# Patient Record
Sex: FEMALE | Race: WHITE | NOT HISPANIC OR LATINO | URBAN - METROPOLITAN AREA
[De-identification: names, ages, dates, MRNs, and addresses within clinical notes are randomized per-mention and may not be internally consistent; named-entity substitution may affect disease eponyms.]

---

## 2018-04-19 ENCOUNTER — ANESTHESIA (OUTPATIENT)
Dept: ENDOSCOPY | Facility: HOSPITAL | Age: 53
End: 2018-04-19
Payer: COMMERCIAL

## 2018-04-19 ENCOUNTER — ANESTHESIA EVENT (OUTPATIENT)
Dept: ENDOSCOPY | Facility: HOSPITAL | Age: 53
End: 2018-04-19
Payer: COMMERCIAL

## 2018-04-19 PROBLEM — Z12.11 COLON CANCER SCREENING: Status: ACTIVE | Noted: 2018-04-19

## 2018-04-19 PROCEDURE — 25800000 HC PHARMACY IV SOLUTIONS: Performed by: SPECIALIST

## 2018-04-19 PROCEDURE — 63600000 HC DRUGS/DETAIL CODE: Performed by: SPECIALIST

## 2018-04-19 RX ORDER — SODIUM CHLORIDE, SODIUM GLUCONATE, SODIUM ACETATE, POTASSIUM CHLORIDE AND MAGNESIUM CHLORIDE 30; 37; 368; 526; 502 MG/100ML; MG/100ML; MG/100ML; MG/100ML; MG/100ML
INJECTION, SOLUTION INTRAVENOUS CONTINUOUS PRN
Status: DISCONTINUED | OUTPATIENT
Start: 2018-04-19 | End: 2018-04-19 | Stop reason: SURG

## 2018-04-19 RX ORDER — PROPOFOL 10 MG/ML
INJECTION, EMULSION INTRAVENOUS CONTINUOUS PRN
Status: DISCONTINUED | OUTPATIENT
Start: 2018-04-19 | End: 2018-04-19 | Stop reason: SURG

## 2018-04-19 RX ADMIN — PROPOFOL 150 MCG/KG/MIN: 10 INJECTION, EMULSION INTRAVENOUS at 09:21

## 2018-04-19 RX ADMIN — SODIUM CHLORIDE, SODIUM GLUCONATE, SODIUM ACETATE, POTASSIUM CHLORIDE AND MAGNESIUM CHLORIDE: 526; 502; 368; 37; 30 INJECTION, SOLUTION INTRAVENOUS at 09:21

## 2018-04-19 ASSESSMENT — PAIN SCALES - GENERAL: PAIN_LEVEL: 1

## 2018-04-19 NOTE — ANESTHESIA POSTPROCEDURE EVALUATION
Patient: Jen Rocha    Procedure Summary     Date:  04/19/18 Room / Location:  [facility] GI 2 / [facility] GI    Anesthesia Start:  0922 Anesthesia Stop:  0955    Procedure:  Colonoscopy (N/A Anus) Diagnosis:  (Screening)    Provider:  Jv Barroso MD Responsible Provider:  Bulmaro Pettit MD    Anesthesia Type:  MAC ASA Status:  2          Anesthesia Type: MAC  PACU Vitals     No data found.            Anesthesia Post Evaluation    Pain score: 1  Pain management: adequate  Patient location during evaluation: PACU  Patient participation: waiting for patient participation  Level of consciousness: responsive to touch and arousable  Cardiovascular status: acceptable  Airway Patency: adequate  Respiratory status: acceptable  Hydration status: acceptable  Anesthetic complications: no

## 2018-04-19 NOTE — ANESTHESIA PREPROCEDURE EVALUATION
Anesthesia ROS/MED HX      Endo/Other   Hypothyroidism   Body Habitus: Overweight      Past Surgical History:   Procedure Laterality Date   • ABDOMINAL SURGERY     • THYROIDECTOMY     • TUBAL LIGATION         Physical Exam    Airway   Mallampati: II   TM distance: >3 FB   Neck ROM: full  Cardiovascular - normal   Rhythm: regular   Rate: normal  Pulmonary - normal   clear to auscultation  Dental - normal        Anesthesia Plan    Plan: MAC    Technique: MAC     Airway: natural airway / supplemental oxygen   ASA 2  Anesthetic plan and risks discussed with: patient

## 2018-04-20 ENCOUNTER — TELEPHONE (OUTPATIENT)
Dept: SURGERY | Facility: CLINIC | Age: 53
End: 2018-04-20

## 2018-10-08 ENCOUNTER — OFFICE VISIT (OUTPATIENT)
Dept: SURGERY | Facility: CLINIC | Age: 53
End: 2018-10-08
Payer: COMMERCIAL

## 2018-10-08 VITALS — HEIGHT: 69 IN | WEIGHT: 187 LBS | BODY MASS INDEX: 27.7 KG/M2

## 2018-10-08 DIAGNOSIS — K62.5 RECTAL BLEEDING: Primary | ICD-10-CM

## 2018-10-08 PROCEDURE — 99244 OFF/OP CNSLTJ NEW/EST MOD 40: CPT | Performed by: SURGERY

## 2018-10-08 NOTE — ASSESSMENT & PLAN NOTE
This nice lady had six small polyps found on colonoscopy 6 months ago.  She has had three episodes of bleeding in one week which may have been precipitated by straining at stools.  Her GABRIELA is normal today and my plan is to simply observe.  If her bleeding increases in frequency then injection sclerotherapy may be helpful in the future.

## 2018-10-08 NOTE — LETTER
October 8, 2018     Norberto Vaughn MD  [address]  [place] [facility] [place] [address]    Patient: Jen Rocha   YOB: 1965   Date of Visit: 10/8/2018       Dear Dr. Vaughn:    Thank you for referring Jen Rocha to me for evaluation. Below are my notes for this consultation.    If you have questions, please do not hesitate to call me. I look forward to following your patient along with you.         Sincerely,        Jv Barroso MD        CC: No Recipients  Jv Barroso MD  10/8/2018  7:19 PM  Sign at close encounter      Chief Complaint:   Chief Complaint   Patient presents with   • Consult     anal bleeding   .    HPI     Patient is a 53 y.o. female who presents with the following:  Pt had colonoscopy 4/18 and 6 small polyps foun d- plan 3y for next scope -     4d ago had blood with BM's x2 - the bowl was red - a small amount on paper - could have been dripping - happened yesterday again -     Never happened before - she was def straining the day before the BM with the first blood - no pain at all - .     Medical History:   Past Medical History:   Diagnosis Date   • Arthritis    • H/O thyroid nodule        Surgical History:   Past Surgical History:   Procedure Laterality Date   • ABDOMINAL SURGERY     • THYROIDECTOMY     • TUBAL LIGATION         Social History:   Social History     Social History   • Marital status:      Spouse name: N/A   • Number of children: N/A   • Years of education: N/A     Occupational History   • Not on file.     Social History Main Topics   • Smoking status: Current Every Day Smoker     Packs/day: 0.50     Years: 30.00   • Smokeless tobacco: Never Used   • Alcohol use 0.6 oz/week     1 Glasses of wine per week   • Drug use: No   • Sexual activity: Defer     Other Topics Concern   • Not on file     Social History Narrative   • No narrative on file       Family History:   History reviewed. No pertinent family history.    Allergies:    Patient has no known allergies.    Current Medications:      Current Outpatient Prescriptions:   •  ascorbic acid, vitamin C, (vitamin C) 100 mg tablet, 100 mg., Disp: , Rfl:   •  calcium citrate-vitamin D3 (CITRACAL) 200 mg calcium -250 unit tablet, Take by mouth daily., Disp: , Rfl:   •  evening primrose oil 500 mg capsule, Take 500 mg by mouth., Disp: , Rfl:   •  flaxseed oil 1,000 mg capsule, Take by mouth daily., Disp: , Rfl:   •  lactobacillus comb no.10 (PROBIOTIC) 20 billion cell capsule, Take by mouth., Disp: , Rfl:   •  levothyroxine (SYNTHROID) 125 mcg tablet, Take 175 mcg by mouth daily., Disp: , Rfl:   •  mv,Ca,min-folic acid-vit K1 (ONE-A-DAY WOMEN'S 50 PLUS) 400-20 mcg tablet, Take by mouth., Disp: , Rfl:   •  omega-3 fatty acids-fish oil 340-1,000 mg capsule, Take by mouth 2 (two) times a day., Disp: , Rfl:     Review of Systems  All 14 systems reviewed and the findings are not pertinent to the current problem.    Objective     Vital Signs  There were no vitals filed for this visit.  Body mass index is 27.62 kg/m².      Physicial Exam  General Appearance:  Well developed.  Well nourished.  In no acute distress. Patient was not obese.  Head:  Posture of the head was normal.  Neck:  External features of the neck was normal.  Trachea:  Showed no abnormalities.  Trachea midline.  Extraocular Movements:  Normal.  Pupils:  Reactive to light.   Not deformed.  Oral Cavity:  Mixed dentition was not observed.  Tongue was midline.  Peripheral edema:  Not present.  Genitalia:  Normal.    Anorectal Examination:    No pilonidal sinus was observed.   No pilonidal cyst was observed.   Perianal skin was not erythematous.  No perianal wart was observed.  No anal fissure was observed.   Anus did not have a fistula.   Anal sphincter tone was normal.   No hemorrhoids were seen.   No external anal skin tags were observed.   Anus had no mass.  Rectum:  No rectal abscess was observed.   Rectal prolapse was not observed.    No rectal fistula was observed.   Rectal exam was not tender.   No rectal fluctuance was observed.  Rectal exam showed no mass.   Normal misty-anal skin with no lesions or dermatitis    Only moderate hemorrhoids.  No mass on GABRIELA.    Other Exam:  Musculoskeletal System:  No gross defecits or defects of the upper or lower extremities.  No cyanosis of the fingers.  Lumbar / Lumbosacral Spine:  Lumbar/lumbosacral spine exhibited no abnormalities.  Bilateral thighs:   Posterior aspect was not swollen.   Posterior aspect showed no induration.   Posterior aspect was not erythematous.   Posterior aspect was not warm.   No mass on the posterior aspect.  Functional Exam:   General/bilateral:  Mobility was not limited.  Neurological:   No confusion was observed.   No disorientation was observed.  Speech:  Normal.  Motor:  No tremor was seen.  Balance:  Normal.  Gait And Stance:  Normal.  Psychiatric:  Mood:  Euthymic.  Affect:  Normal.  Skin:  No clubbing of the fingernails.  Normal upper and lower extremity skin exam.      Problem List Items Addressed This Visit     Rectal bleeding - Primary     This nice lady had six small polyps found on colonoscopy 6 months ago.  She has had three episodes of bleeding in one week which may have been precipitated by straining at stools.  Her GABRIELA is normal today and my plan is to simply observe.  If her bleeding increases in frequency then injection sclerotherapy may be helpful in the future.                     Jv Barroso MD 10/8/2018 7:19 PM

## 2018-10-08 NOTE — PROGRESS NOTES
Chief Complaint:   Chief Complaint   Patient presents with   • Consult     anal bleeding   .    HPI     Patient is a 53 y.o. female who presents with the following:  Pt had colonoscopy 4/18 and 6 small polyps foun d- plan 3y for next scope -     4d ago had blood with BM's x2 - the bowl was red - a small amount on paper - could have been dripping - happened yesterday again -     Never happened before - she was def straining the day before the BM with the first blood - no pain at all - .     Medical History:   Past Medical History:   Diagnosis Date   • Arthritis    • H/O thyroid nodule        Surgical History:   Past Surgical History:   Procedure Laterality Date   • ABDOMINAL SURGERY     • THYROIDECTOMY     • TUBAL LIGATION         Social History:   Social History     Social History   • Marital status:      Spouse name: N/A   • Number of children: N/A   • Years of education: N/A     Occupational History   • Not on file.     Social History Main Topics   • Smoking status: Current Every Day Smoker     Packs/day: 0.50     Years: 30.00   • Smokeless tobacco: Never Used   • Alcohol use 0.6 oz/week     1 Glasses of wine per week   • Drug use: No   • Sexual activity: Defer     Other Topics Concern   • Not on file     Social History Narrative   • No narrative on file       Family History:   History reviewed. No pertinent family history.    Allergies:   Patient has no known allergies.    Current Medications:      Current Outpatient Prescriptions:   •  ascorbic acid, vitamin C, (vitamin C) 100 mg tablet, 100 mg., Disp: , Rfl:   •  calcium citrate-vitamin D3 (CITRACAL) 200 mg calcium -250 unit tablet, Take by mouth daily., Disp: , Rfl:   •  evening primrose oil 500 mg capsule, Take 500 mg by mouth., Disp: , Rfl:   •  flaxseed oil 1,000 mg capsule, Take by mouth daily., Disp: , Rfl:   •  lactobacillus comb no.10 (PROBIOTIC) 20 billion cell capsule, Take by mouth., Disp: , Rfl:   •  levothyroxine (SYNTHROID) 125 mcg  tablet, Take 175 mcg by mouth daily., Disp: , Rfl:   •  mv,Ca,min-folic acid-vit K1 (ONE-A-DAY WOMEN'S 50 PLUS) 400-20 mcg tablet, Take by mouth., Disp: , Rfl:   •  omega-3 fatty acids-fish oil 340-1,000 mg capsule, Take by mouth 2 (two) times a day., Disp: , Rfl:     Review of Systems  All 14 systems reviewed and the findings are not pertinent to the current problem.    Objective     Vital Signs  There were no vitals filed for this visit.  Body mass index is 27.62 kg/m².      Physicial Exam  General Appearance:  Well developed.  Well nourished.  In no acute distress. Patient was not obese.  Head:  Posture of the head was normal.  Neck:  External features of the neck was normal.  Trachea:  Showed no abnormalities.  Trachea midline.  Extraocular Movements:  Normal.  Pupils:  Reactive to light.   Not deformed.  Oral Cavity:  Mixed dentition was not observed.  Tongue was midline.  Peripheral edema:  Not present.  Genitalia:  Normal.    Anorectal Examination:    No pilonidal sinus was observed.   No pilonidal cyst was observed.   Perianal skin was not erythematous.  No perianal wart was observed.  No anal fissure was observed.   Anus did not have a fistula.   Anal sphincter tone was normal.   No hemorrhoids were seen.   No external anal skin tags were observed.   Anus had no mass.  Rectum:  No rectal abscess was observed.   Rectal prolapse was not observed.   No rectal fistula was observed.   Rectal exam was not tender.   No rectal fluctuance was observed.  Rectal exam showed no mass.   Normal misty-anal skin with no lesions or dermatitis    Only moderate hemorrhoids.  No mass on GABRIELA.    Other Exam:  Musculoskeletal System:  No gross defecits or defects of the upper or lower extremities.  No cyanosis of the fingers.  Lumbar / Lumbosacral Spine:  Lumbar/lumbosacral spine exhibited no abnormalities.  Bilateral thighs:   Posterior aspect was not swollen.   Posterior aspect showed no induration.   Posterior aspect was not  erythematous.   Posterior aspect was not warm.   No mass on the posterior aspect.  Functional Exam:   General/bilateral:  Mobility was not limited.  Neurological:   No confusion was observed.   No disorientation was observed.  Speech:  Normal.  Motor:  No tremor was seen.  Balance:  Normal.  Gait And Stance:  Normal.  Psychiatric:  Mood:  Euthymic.  Affect:  Normal.  Skin:  No clubbing of the fingernails.  Normal upper and lower extremity skin exam.      Problem List Items Addressed This Visit     Rectal bleeding - Primary     This nice lady had six small polyps found on colonoscopy 6 months ago.  She has had three episodes of bleeding in one week which may have been precipitated by straining at stools.  Her GABRIELA is normal today and my plan is to simply observe.  If her bleeding increases in frequency then injection sclerotherapy may be helpful in the future.                     Jv Barroso MD 10/8/2018 7:19 PM

## 2019-06-26 ENCOUNTER — TRANSCRIBE ORDERS (OUTPATIENT)
Dept: SCHEDULING | Age: 54
End: 2019-06-26

## 2019-06-26 DIAGNOSIS — M54.16 RADICULOPATHY OF LUMBAR REGION: Primary | ICD-10-CM

## 2019-06-26 DIAGNOSIS — M76.899 OTHER SPECIFIED ENTHESOPATHIES OF UNSPECIFIED LOWER LIMB, EXCLUDING FOOT: ICD-10-CM

## 2019-07-08 ENCOUNTER — HOSPITAL ENCOUNTER (OUTPATIENT)
Dept: RADIOLOGY | Age: 54
Discharge: HOME | End: 2019-07-08
Attending: PHYSICAL MEDICINE & REHABILITATION
Payer: COMMERCIAL

## 2019-07-08 DIAGNOSIS — M76.899 OTHER SPECIFIED ENTHESOPATHIES OF UNSPECIFIED LOWER LIMB, EXCLUDING FOOT: ICD-10-CM

## 2019-07-08 DIAGNOSIS — M54.16 RADICULOPATHY OF LUMBAR REGION: ICD-10-CM

## 2020-12-08 ENCOUNTER — HOSPITAL ENCOUNTER (OUTPATIENT)
Dept: RADIOLOGY | Facility: CLINIC | Age: 55
Discharge: HOME | End: 2020-12-08
Attending: PODIATRIST
Payer: COMMERCIAL

## 2020-12-08 ENCOUNTER — TRANSCRIBE ORDERS (OUTPATIENT)
Dept: LAB | Facility: CLINIC | Age: 55
End: 2020-12-08

## 2020-12-08 DIAGNOSIS — M21.622 BUNIONETTE OF LEFT FOOT: ICD-10-CM

## 2020-12-08 DIAGNOSIS — M21.622 BUNIONETTE OF LEFT FOOT: Primary | ICD-10-CM

## 2020-12-08 PROCEDURE — 73630 X-RAY EXAM OF FOOT: CPT | Mod: LT

## 2021-01-22 ENCOUNTER — TRANSCRIBE ORDERS (OUTPATIENT)
Dept: SCHEDULING | Age: 56
End: 2021-01-22

## 2021-01-22 DIAGNOSIS — M77.42 METATARSALGIA, LEFT FOOT: ICD-10-CM

## 2021-01-22 DIAGNOSIS — M20.42 OTHER HAMMER TOE(S) (ACQUIRED), LEFT FOOT: Primary | ICD-10-CM

## 2021-01-22 DIAGNOSIS — M79.672 PAIN IN LEFT FOOT: ICD-10-CM

## 2021-01-29 ENCOUNTER — HOSPITAL ENCOUNTER (OUTPATIENT)
Dept: RADIOLOGY | Facility: CLINIC | Age: 56
Discharge: HOME | End: 2021-01-29
Attending: PODIATRIST
Payer: COMMERCIAL

## 2021-01-29 DIAGNOSIS — M77.42 METATARSALGIA, LEFT FOOT: ICD-10-CM

## 2021-01-29 DIAGNOSIS — M79.672 PAIN IN LEFT FOOT: ICD-10-CM

## 2021-01-29 DIAGNOSIS — M20.42 OTHER HAMMER TOE(S) (ACQUIRED), LEFT FOOT: ICD-10-CM

## 2021-04-15 DIAGNOSIS — Z23 ENCOUNTER FOR IMMUNIZATION: ICD-10-CM

## 2022-03-18 ENCOUNTER — DOCUMENTATION (OUTPATIENT)
Dept: SURGERY | Facility: CLINIC | Age: 57
End: 2022-03-18
Payer: COMMERCIAL

## 2022-03-18 NOTE — PROGRESS NOTES
Questions YES NO   1 Do you have a personal history of Inflammatory Bowel Disease, such as ulcerative colitis or Crohn’s disease?  X    If no, proceed to question #2.    If Yes Stop the assessment and advise the patient to see their gastroenterologist.   2 Do you have a Personal history of colon cancer or rectal cancer?  X    If not, proceed to question 3.    If yes, were you managed by Dr. Barroso or Dr. Ac?      If patient were managed by one of the Providers, go to question 3.    If patient were not managed by one of the providers, please make an appointment with Dr. Barroso or Dr. Ac.   3 Do you have any of the following symptoms?    Change in bowel habits  X    Bleeding with bowel movements or rectal bleeding  X    Hemorrhoids  X    Narrow Stools  X    Fatigue  X    Weight Loss  X    Decreased appetite  X    Rectal or abdominal pan  X    Anemia  X    If less than 3 symptoms, proceed to question 4.    If more than 3 symptoms, schedule an appointment with Dr. Barroso or Yashira.   4 Have you had a colonoscopy in the last 10 years? X     If no, proceed to question 5.    If yes, when were you recommended to have a follow up colonoscopy?  Years    If this colonoscopy is on or after the recommended time interval, please proceed to question 5.    If this colonoscopy is before the recommended time interval, please advise the patient to make an appointment to with Dr. Barroso or Dr. Ac, then proceed to question 5.   5 Do you have a personal history of colon or rectal polyps? X    6 Do you have a family history of colon cancer or rectal cancer?  X   7 Do you have a family history of any other cancers? X    Father- prostate cancer, Sister- thyroid cancer, pt's last scope was 2018

## 2022-03-19 ENCOUNTER — PREP FOR CASE (OUTPATIENT)
Dept: SURGERY | Facility: CLINIC | Age: 57
End: 2022-03-19
Payer: COMMERCIAL

## 2022-03-22 ENCOUNTER — HOSPITAL ENCOUNTER (EMERGENCY)
Facility: HOSPITAL | Age: 57
Discharge: HOME | End: 2022-03-22
Attending: EMERGENCY MEDICINE
Payer: COMMERCIAL

## 2022-03-22 VITALS
TEMPERATURE: 98.1 F | DIASTOLIC BLOOD PRESSURE: 94 MMHG | OXYGEN SATURATION: 99 % | RESPIRATION RATE: 16 BRPM | HEART RATE: 76 BPM | WEIGHT: 178 LBS | HEIGHT: 69 IN | SYSTOLIC BLOOD PRESSURE: 156 MMHG | BODY MASS INDEX: 26.36 KG/M2

## 2022-03-22 DIAGNOSIS — L03.113 CELLULITIS OF RIGHT ELBOW: Primary | ICD-10-CM

## 2022-03-22 PROCEDURE — 63700000 HC SELF-ADMINISTRABLE DRUG: Performed by: EMERGENCY MEDICINE

## 2022-03-22 PROCEDURE — 99283 EMERGENCY DEPT VISIT LOW MDM: CPT

## 2022-03-22 PROCEDURE — 36415 COLL VENOUS BLD VENIPUNCTURE: CPT | Performed by: EMERGENCY MEDICINE

## 2022-03-22 PROCEDURE — 86618 LYME DISEASE ANTIBODY: CPT | Performed by: EMERGENCY MEDICINE

## 2022-03-22 RX ORDER — DOXYCYCLINE 100 MG/1
100 CAPSULE ORAL 2 TIMES DAILY
Qty: 14 CAPSULE | Refills: 0 | Status: SHIPPED | OUTPATIENT
Start: 2022-03-22 | End: 2022-03-29

## 2022-03-22 RX ORDER — ROSUVASTATIN CALCIUM 10 MG/1
10 TABLET, COATED ORAL DAILY
COMMUNITY

## 2022-03-22 RX ORDER — DOXYCYCLINE HYCLATE 100 MG
100 TABLET ORAL ONCE
Status: COMPLETED | OUTPATIENT
Start: 2022-03-22 | End: 2022-03-22

## 2022-03-22 RX ADMIN — DOXYCYCLINE HYCLATE 100 MG: 100 TABLET, FILM COATED ORAL at 14:35

## 2022-03-22 ASSESSMENT — ENCOUNTER SYMPTOMS
ARTHRALGIAS: 0
THROAT SWELLING: 0
FEVER: 0
JOINT SWELLING: 0
FATIGUE: 0
HOARSE VOICE: 0
MYALGIAS: 0

## 2022-03-22 NOTE — DISCHARGE INSTRUCTIONS
Your blood pressure was elevated while in the ED.  It is very important that you follow up with your primary care provider for a blood pressure re-check and further management.    Apply warm compresses to area 4 times a day for 10 minutes each

## 2022-03-22 NOTE — ED ATTESTATION NOTE
I have personally seen and examined the patient.  I reviewed and agree with physician assistant / nurse practitioner’s assessment and plan of care.     Exam: Evaluation of the patient's right upper extremity reveals an area of superficial cellulitis with mild edema distal to and surrounding the elbow but does not appear to involve the bursa.  There is no lymphangitis or axillary lymphadenopathy.  The extremity is neurovascularly intact.  The swollen erythematous area is well-circumscribed and not consistent with findings concerning for DVT.  The swollen area is indurated and not fluctuant or consistent with abscess.  Patient is mildly hypertensive but the remainder of her vital signs are stable.    Plan: Will treat suspected cellulitis with doxycycline for common dede as well as MRSA coverage.  Will recommend close follow-up with her PCP in 48 hours for a skin check.  Patient was instructed to return immediately to the ER if the infection extends beyond the marked area on her skin, fever or any other worsening or worrisome symptoms.           Raciel Durand DO  03/22/22 0889

## 2022-03-22 NOTE — ED PROVIDER NOTES
Emergency Medicine Note  HPI   HISTORY OF PRESENT ILLNESS       History provided by:  Patient  Rash  Location:  Shoulder/arm  Shoulder/arm rash location:  R elbow  Quality: painful and redness    Pain details:     Quality:  Dull    Severity:  Mild    Onset quality:  Sudden    Duration:  2 days    Timing:  Constant    Progression:  Worsening  Severity:  Mild  Onset quality:  Sudden  Duration:  2 days  Timing:  Constant  Progression:  Worsening  Chronicity:  New  Context comment:  Yesterday notice red irritated area on right elbow.  today redness increased with swelling  Relieved by:  Nothing  Worsened by:  Nothing  Ineffective treatments: cold compresses.  Associated symptoms: no fatigue, no fever, no hoarse voice, no induration, no joint pain, no myalgias, no throat swelling and no tongue swelling          Patient History   PAST HISTORY     Reviewed from Nursing Triage:  Tobacco  Allergies  Meds  Problems  Med Hx  Surg Hx  Fam Hx  Soc   Hx        Past Medical History:   Diagnosis Date   • Arthritis    • Disease of thyroid gland    • H/O thyroid nodule    • Lipid disorder        Past Surgical History:   Procedure Laterality Date   • ABDOMINAL SURGERY     • ABDOMINAL SURGERY     • THYROIDECTOMY     • THYROIDECTOMY     • TUBAL LIGATION         History reviewed. No pertinent family history.    Social History     Tobacco Use   • Smoking status: Current Every Day Smoker     Packs/day: 0.50     Years: 30.00     Pack years: 15.00   • Smokeless tobacco: Never Used   Vaping Use   • Vaping Use: Never used   Substance Use Topics   • Alcohol use: Yes     Alcohol/week: 1.0 standard drink     Types: 1 Glasses of wine per week   • Drug use: No         Review of Systems   REVIEW OF SYSTEMS     Review of Systems   Constitutional: Negative for fatigue and fever.   HENT: Negative for hoarse voice.    Musculoskeletal: Negative for arthralgias, joint swelling and myalgias.   Skin: Positive for rash.         VITALS     ED Vitals     Date/Time Temp Pulse Resp BP SpO2 [facility]   03/22/22 1441 -- -- -- 156/94 -- BM   03/22/22 1407 36.7 °C (98.1 °F) 76 16 200/79 99 % CMS        Pulse Ox %: 99 % (03/22/22 1412)  Pulse Ox Interpretation: Normal (03/22/22 1412)           Physical Exam   PHYSICAL EXAM     Physical Exam  Vitals and nursing note reviewed.   Constitutional:       Appearance: Normal appearance. She is normal weight.   Cardiovascular:      Pulses:           Radial pulses are 2+ on the right side.   Musculoskeletal:      Right upper arm: No swelling or tenderness.      Right elbow: Swelling present. Normal range of motion.      Right forearm: No swelling or tenderness.        Arms:    Skin:     General: Skin is warm and dry.   Neurological:      Mental Status: She is alert.           PROCEDURES     Procedures     DATA     Results     Procedure Component Value Units Date/Time    Lyme EIA reflex WB [[id number]] Collected: 03/22/22 1438    Specimen: Blood, Venous Updated: 03/22/22 1444          Imaging Results    None         No orders to display       Scoring tools                                 ED Course & MDM   MDM / ED COURSE / CLINICAL IMPRESSIONS / DISPO     MDM    Clinical Impressions as of 03/22/22 1538   Cellulitis of right elbow     Discharge         Santi Smith PA C  03/22/22 1539

## 2022-03-23 LAB — B BURGDOR AB SER IA-ACNC: 0.24 RATIO

## 2022-03-25 DIAGNOSIS — Z01.812 ENCOUNTER FOR PREPROCEDURE SCREENING LABORATORY TESTING FOR COVID-19: Primary | ICD-10-CM

## 2022-03-25 DIAGNOSIS — Z11.52 ENCOUNTER FOR PREPROCEDURE SCREENING LABORATORY TESTING FOR COVID-19: Primary | ICD-10-CM

## 2022-08-12 ENCOUNTER — APPOINTMENT (EMERGENCY)
Dept: RADIOLOGY | Facility: HOSPITAL | Age: 57
DRG: 641 | End: 2022-08-12
Attending: EMERGENCY MEDICINE
Payer: COMMERCIAL

## 2022-08-12 ENCOUNTER — HOSPITAL ENCOUNTER (INPATIENT)
Facility: HOSPITAL | Age: 57
LOS: 1 days | Discharge: HOME | DRG: 641 | End: 2022-08-14
Attending: EMERGENCY MEDICINE | Admitting: INTERNAL MEDICINE
Payer: COMMERCIAL

## 2022-08-12 DIAGNOSIS — E87.1 HYPONATREMIA: Primary | ICD-10-CM

## 2022-08-12 LAB
BASOPHILS # BLD: 0.03 K/UL (ref 0.01–0.1)
BASOPHILS NFR BLD: 0.4 %
DIFFERENTIAL METHOD BLD: ABNORMAL
EOSINOPHIL # BLD: 0.02 K/UL (ref 0.04–0.36)
EOSINOPHIL NFR BLD: 0.3 %
ERYTHROCYTE [DISTWIDTH] IN BLOOD BY AUTOMATED COUNT: 12.3 % (ref 11.7–14.4)
HCT VFR BLDCO AUTO: 39.6 % (ref 35–45)
HGB BLD-MCNC: 13.3 G/DL (ref 11.8–15.7)
IMM GRANULOCYTES # BLD AUTO: 0.03 K/UL (ref 0–0.08)
IMM GRANULOCYTES NFR BLD AUTO: 0.4 %
LIPASE SERPL-CCNC: 37 U/L (ref 20–51)
LYMPHOCYTES # BLD: 1.43 K/UL (ref 1.2–3.5)
LYMPHOCYTES NFR BLD: 19.5 %
MCH RBC QN AUTO: 30.3 PG (ref 28–33.2)
MCHC RBC AUTO-ENTMCNC: 33.6 G/DL (ref 32.2–35.5)
MCV RBC AUTO: 90.2 FL (ref 83–98)
MONOCYTES # BLD: 0.68 K/UL (ref 0.28–0.8)
MONOCYTES NFR BLD: 9.3 %
NEUTROPHILS # BLD: 5.13 K/UL (ref 1.7–7)
NEUTS SEG NFR BLD: 70.1 %
NRBC BLD-RTO: 0 %
PDW BLD AUTO: 9.4 FL (ref 9.4–12.3)
PLATELET # BLD AUTO: 243 K/UL (ref 150–369)
RBC # BLD AUTO: 4.39 M/UL (ref 3.93–5.22)
TROPONIN I SERPL HS-MCNC: 2.1 PG/ML
WBC # BLD AUTO: 7.32 K/UL (ref 3.8–10.5)

## 2022-08-12 PROCEDURE — 84484 ASSAY OF TROPONIN QUANT: CPT | Performed by: PHYSICIAN ASSISTANT

## 2022-08-12 PROCEDURE — 83690 ASSAY OF LIPASE: CPT | Performed by: PHYSICIAN ASSISTANT

## 2022-08-12 PROCEDURE — 71046 X-RAY EXAM CHEST 2 VIEWS: CPT

## 2022-08-12 PROCEDURE — 36415 COLL VENOUS BLD VENIPUNCTURE: CPT | Performed by: PHYSICIAN ASSISTANT

## 2022-08-12 PROCEDURE — 63600000 HC DRUGS/DETAIL CODE: Performed by: PHYSICIAN ASSISTANT

## 2022-08-12 PROCEDURE — 25800000 HC PHARMACY IV SOLUTIONS: Performed by: PHYSICIAN ASSISTANT

## 2022-08-12 PROCEDURE — 85025 COMPLETE CBC W/AUTO DIFF WBC: CPT | Performed by: PHYSICIAN ASSISTANT

## 2022-08-12 PROCEDURE — 80053 COMPREHEN METABOLIC PANEL: CPT | Performed by: PHYSICIAN ASSISTANT

## 2022-08-12 PROCEDURE — 63700000 HC SELF-ADMINISTRABLE DRUG: Performed by: PHYSICIAN ASSISTANT

## 2022-08-12 PROCEDURE — 82248 BILIRUBIN DIRECT: CPT | Performed by: PHYSICIAN ASSISTANT

## 2022-08-12 PROCEDURE — 99284 EMERGENCY DEPT VISIT MOD MDM: CPT | Mod: 25

## 2022-08-12 PROCEDURE — 96374 THER/PROPH/DIAG INJ IV PUSH: CPT

## 2022-08-12 PROCEDURE — 93005 ELECTROCARDIOGRAM TRACING: CPT | Performed by: PHYSICIAN ASSISTANT

## 2022-08-12 RX ORDER — ONDANSETRON HYDROCHLORIDE 2 MG/ML
4 INJECTION, SOLUTION INTRAVENOUS ONCE
Status: COMPLETED | OUTPATIENT
Start: 2022-08-12 | End: 2022-08-12

## 2022-08-12 RX ORDER — LORAZEPAM 0.5 MG/1
0.5 TABLET ORAL ONCE
Status: COMPLETED | OUTPATIENT
Start: 2022-08-12 | End: 2022-08-12

## 2022-08-12 RX ADMIN — SODIUM CHLORIDE 1000 ML: 9 INJECTION, SOLUTION INTRAVENOUS at 22:50

## 2022-08-12 RX ADMIN — ONDANSETRON 4 MG: 2 INJECTION INTRAMUSCULAR; INTRAVENOUS at 23:03

## 2022-08-12 RX ADMIN — LORAZEPAM 0.5 MG: 0.5 TABLET ORAL at 23:03

## 2022-08-13 PROBLEM — E87.1 HYPONATREMIA: Status: ACTIVE | Noted: 2022-08-13

## 2022-08-13 LAB
ALBUMIN SERPL-MCNC: 4.4 G/DL (ref 3.4–5)
ALP SERPL-CCNC: 71 IU/L (ref 35–126)
ALT SERPL-CCNC: 24 IU/L (ref 11–54)
ANION GAP SERPL CALC-SCNC: 11 MEQ/L (ref 3–15)
ANION GAP SERPL CALC-SCNC: 11 MEQ/L (ref 3–15)
ANION GAP SERPL CALC-SCNC: 7 MEQ/L (ref 3–15)
AST SERPL-CCNC: 27 IU/L (ref 15–41)
ATRIAL RATE: 58
BILIRUB DIRECT SERPL-MCNC: <0.1 MG/DL
BILIRUB SERPL-MCNC: 0.7 MG/DL (ref 0.3–1.2)
BILIRUB UR QL STRIP.AUTO: NEGATIVE MG/DL
BUN SERPL-MCNC: 15 MG/DL (ref 8–20)
BUN SERPL-MCNC: 9 MG/DL (ref 8–20)
BUN SERPL-MCNC: 9 MG/DL (ref 8–20)
CALCIUM SERPL-MCNC: 8.4 MG/DL (ref 8.9–10.3)
CALCIUM SERPL-MCNC: 9 MG/DL (ref 8.9–10.3)
CALCIUM SERPL-MCNC: 9 MG/DL (ref 8.9–10.3)
CHLORIDE SERPL-SCNC: 102 MEQ/L (ref 98–109)
CHLORIDE SERPL-SCNC: 86 MEQ/L (ref 98–109)
CHLORIDE SERPL-SCNC: 92 MEQ/L (ref 98–109)
CLARITY UR REFRACT.AUTO: CLEAR
CO2 SERPL-SCNC: 24 MEQ/L (ref 22–32)
CO2 SERPL-SCNC: 25 MEQ/L (ref 22–32)
CO2 SERPL-SCNC: 26 MEQ/L (ref 22–32)
COLOR UR AUTO: COLORLESS
CREAT SERPL-MCNC: 0.4 MG/DL (ref 0.6–1.1)
CREAT SERPL-MCNC: 0.4 MG/DL (ref 0.6–1.1)
CREAT SERPL-MCNC: 0.6 MG/DL (ref 0.6–1.1)
GFR SERPL CREATININE-BSD FRML MDRD: >60 ML/MIN/1.73M*2
GLUCOSE SERPL-MCNC: 105 MG/DL (ref 70–99)
GLUCOSE SERPL-MCNC: 107 MG/DL (ref 70–99)
GLUCOSE SERPL-MCNC: 123 MG/DL (ref 70–99)
GLUCOSE UR STRIP.AUTO-MCNC: NEGATIVE MG/DL
HGB UR QL STRIP.AUTO: NEGATIVE
KETONES UR STRIP.AUTO-MCNC: 1 MG/DL
LEUKOCYTE ESTERASE UR QL STRIP.AUTO: NEGATIVE
NITRITE UR QL STRIP.AUTO: NEGATIVE
OSMOLALITY UR: 96 MOSM/KG (ref 100–1400)
P AXIS: 56
PH UR STRIP.AUTO: 7 [PH]
POTASSIUM SERPL-SCNC: 3.4 MEQ/L (ref 3.6–5.1)
POTASSIUM SERPL-SCNC: 3.8 MEQ/L (ref 3.6–5.1)
POTASSIUM SERPL-SCNC: 4 MEQ/L (ref 3.6–5.1)
PR INTERVAL: 156
PROT SERPL-MCNC: 7.5 G/DL (ref 6–8.2)
PROT UR QL STRIP.AUTO: NEGATIVE
QRS DURATION: 92
QT INTERVAL: 458
QTC CALCULATION(BAZETT): 449
R AXIS: 33
SARS-COV-2 RNA RESP QL NAA+PROBE: NEGATIVE
SODIUM SERPL-SCNC: 123 MEQ/L (ref 136–144)
SODIUM SERPL-SCNC: 127 MEQ/L (ref 136–144)
SODIUM SERPL-SCNC: 134 MEQ/L (ref 136–144)
SODIUM UR-SCNC: 31 MEQ/L
SP GR UR REFRACT.AUTO: 1
T WAVE AXIS: 35
TROPONIN I SERPL HS-MCNC: 2.4 PG/ML
UROBILINOGEN UR STRIP-ACNC: 0.2 EU/DL
VENTRICULAR RATE: 58

## 2022-08-13 PROCEDURE — 80048 BASIC METABOLIC PNL TOTAL CA: CPT | Performed by: HOSPITALIST

## 2022-08-13 PROCEDURE — 200200 PR NO CHARGE: Performed by: HOSPITALIST

## 2022-08-13 PROCEDURE — 81003 URINALYSIS AUTO W/O SCOPE: CPT | Performed by: PHYSICIAN ASSISTANT

## 2022-08-13 PROCEDURE — 25800000 HC PHARMACY IV SOLUTIONS: Performed by: INTERNAL MEDICINE

## 2022-08-13 PROCEDURE — 63700000 HC SELF-ADMINISTRABLE DRUG: Performed by: HOSPITALIST

## 2022-08-13 PROCEDURE — 1123F ACP DISCUSS/DSCN MKR DOCD: CPT | Performed by: INTERNAL MEDICINE

## 2022-08-13 PROCEDURE — 83935 ASSAY OF URINE OSMOLALITY: CPT | Performed by: INTERNAL MEDICINE

## 2022-08-13 PROCEDURE — U0002 COVID-19 LAB TEST NON-CDC: HCPCS | Performed by: PHYSICIAN ASSISTANT

## 2022-08-13 PROCEDURE — 84484 ASSAY OF TROPONIN QUANT: CPT | Performed by: PHYSICIAN ASSISTANT

## 2022-08-13 PROCEDURE — 80048 BASIC METABOLIC PNL TOTAL CA: CPT | Performed by: PHYSICIAN ASSISTANT

## 2022-08-13 PROCEDURE — 99223 1ST HOSP IP/OBS HIGH 75: CPT | Performed by: INTERNAL MEDICINE

## 2022-08-13 PROCEDURE — 36415 COLL VENOUS BLD VENIPUNCTURE: CPT | Performed by: PHYSICIAN ASSISTANT

## 2022-08-13 PROCEDURE — 93010 ELECTROCARDIOGRAM REPORT: CPT | Performed by: INTERNAL MEDICINE

## 2022-08-13 PROCEDURE — 21400000 HC ROOM AND CARE CCU/INTERMEDIATE

## 2022-08-13 PROCEDURE — 84300 ASSAY OF URINE SODIUM: CPT | Performed by: INTERNAL MEDICINE

## 2022-08-13 RX ORDER — LEVOTHYROXINE SODIUM 125 UG/1
125 TABLET ORAL
Status: DISCONTINUED | OUTPATIENT
Start: 2022-08-13 | End: 2022-08-14 | Stop reason: HOSPADM

## 2022-08-13 RX ORDER — SODIUM CHLORIDE 9 MG/ML
INJECTION, SOLUTION INTRAVENOUS CONTINUOUS
Status: DISCONTINUED | OUTPATIENT
Start: 2022-08-13 | End: 2022-08-14

## 2022-08-13 RX ORDER — ROSUVASTATIN CALCIUM 10 MG/1
10 TABLET, COATED ORAL NIGHTLY
Status: DISCONTINUED | OUTPATIENT
Start: 2022-08-13 | End: 2022-08-14 | Stop reason: HOSPADM

## 2022-08-13 RX ADMIN — SODIUM CHLORIDE: 9 INJECTION, SOLUTION INTRAVENOUS at 02:57

## 2022-08-13 RX ADMIN — SODIUM CHLORIDE: 9 INJECTION, SOLUTION INTRAVENOUS at 19:50

## 2022-08-13 RX ADMIN — ROSUVASTATIN CALCIUM 10 MG: 10 TABLET, FILM COATED ORAL at 22:03

## 2022-08-13 ASSESSMENT — ENCOUNTER SYMPTOMS
DIAPHORESIS: 1
LIGHT-HEADEDNESS: 0
FEVER: 0
VOMITING: 1
DIZZINESS: 0
FATIGUE: 0
CHEST TIGHTNESS: 0
SHORTNESS OF BREATH: 0
HEADACHES: 0
PALPITATIONS: 1
COUGH: 0
ABDOMINAL PAIN: 0
NAUSEA: 1
CHILLS: 0
WEAKNESS: 0

## 2022-08-13 ASSESSMENT — COGNITIVE AND FUNCTIONAL STATUS - GENERAL
EATING MEALS: 4 - NONE
HELP NEEDED FOR PERSONAL GROOMING: 4 - NONE
TOILETING: 4 - NONE
DRESSING REGULAR UPPER BODY CLOTHING: 4 - NONE
HELP NEEDED FOR BATHING: 4 - NONE
DO YOU HAVE SERIOUS DIFFICULTY WALKING OR CLIMBING STAIRS: NO
DRESSING REGULAR LOWER BODY CLOTHING: 4 - NONE

## 2022-08-13 NOTE — ED PROVIDER NOTES
"Emergency Medicine Note  HPI   HISTORY OF PRESENT ILLNESS     56-year-old female history of hypothyroid, hyperlipidemia presents to the ER for anxiety.  Patient states she started with an anxiety attack around 4 PM today.  She states she was having palpitations, diaphoresis to her palms and chest pressure.  She states she vomited several times prior to ER arrival.  She feels like she \"has not come out of it\".  She states she feels very shaky.  She states her last anxiety attack that lasted this long was when she was in her 20s.  She states she is been under significant familial stress over the past 4 months.  She states that this time she still has some chest heaviness sensation and shortness of breath.  She denies any recent illness, fever, chills, abdominal pain or diarrhea.  She states she did take a COVID test when her symptoms started this evening which was negative.  Denies any recent sick contacts.            Patient History   PAST HISTORY     Reviewed from Nursing Triage:         Past Medical History:   Diagnosis Date    Arthritis     Disease of thyroid gland     H/O thyroid nodule     Lipid disorder        Past Surgical History:   Procedure Laterality Date    ABDOMINAL SURGERY      ABDOMINAL SURGERY      THYROIDECTOMY      THYROIDECTOMY      TUBAL LIGATION         History reviewed. No pertinent family history.    Social History     Tobacco Use    Smoking status: Current Every Day Smoker     Packs/day: 0.50     Years: 30.00     Pack years: 15.00    Smokeless tobacco: Never Used   Vaping Use    Vaping Use: Never used   Substance Use Topics    Alcohol use: Yes     Alcohol/week: 1.0 standard drink     Types: 1 Glasses of wine per week    Drug use: No         Review of Systems   REVIEW OF SYSTEMS     Review of Systems   Constitutional: Positive for diaphoresis. Negative for chills, fatigue and fever.   Respiratory: Negative for cough, chest tightness and shortness of breath.    Cardiovascular: " Positive for chest pain and palpitations. Negative for leg swelling.   Gastrointestinal: Positive for nausea and vomiting. Negative for abdominal pain.   Skin: Negative for rash.   Neurological: Negative for dizziness, weakness, light-headedness and headaches.         VITALS     ED Vitals    Date/Time Temp Pulse Resp BP SpO2 [facility]   08/12/22 2209 36.4 °C (97.5 °F) 64 22 188/88 98 % JRV        Pulse Ox %: 98 % (08/12/22 2329)  Pulse Ox Interpretation: Normal (08/12/22 2329)  Heart Rate: 64 (08/12/22 2329)  Rhythm Strip Interpretation: Normal Sinus Rhythm (08/12/22 2329)     Physical Exam   PHYSICAL EXAM     Physical Exam  Vitals and nursing note reviewed.   Constitutional:       General: She is not in acute distress.     Appearance: She is well-developed.   Cardiovascular:      Rate and Rhythm: Normal rate and regular rhythm.      Heart sounds: Normal heart sounds. No murmur heard.  Pulmonary:      Effort: Pulmonary effort is normal. No respiratory distress.      Breath sounds: Normal breath sounds. No stridor.   Chest:      Chest wall: No tenderness.   Abdominal:      General: Bowel sounds are normal. There is no distension.      Palpations: Abdomen is soft.      Tenderness: There is no abdominal tenderness. There is no guarding or rebound.   Musculoskeletal:      Cervical back: Normal range of motion and neck supple.   Skin:     General: Skin is warm and dry.   Neurological:      Mental Status: She is alert and oriented to person, place, and time.   Psychiatric:         Mood and Affect: Mood is anxious.           PROCEDURES     Procedures     DATA     Results     Procedure Component Value Units Date/Time    CBC and differential [[id number]]  (Abnormal) Collected: 08/12/22 2248    Specimen: Blood, Venous Updated: 08/12/22 2306     WBC 7.32 K/uL      RBC 4.39 M/uL      Hemoglobin 13.3 g/dL      Hematocrit 39.6 %      MCV 90.2 fL      MCH 30.3 pg      MCHC 33.6 g/dL      RDW 12.3 %      Platelets 243 K/uL      MPV 9.4 fL       Differential Type Auto     nRBC 0.0 %      Immature Granulocytes 0.4 %      Neutrophils 70.1 %      Lymphocytes 19.5 %      Monocytes 9.3 %      Eosinophils 0.3 %      Basophils 0.4 %      Immature Granulocytes, Absolute 0.03 K/uL      Neutrophils, Absolute 5.13 K/uL      Lymphocytes, Absolute 1.43 K/uL      Monocytes, Absolute 0.68 K/uL      Eosinophils, Absolute 0.02 K/uL      Basophils, Absolute 0.03 K/uL     Basic metabolic panel [[id number]] Collected: 08/12/22 2248    Specimen: Blood, Venous Updated: 08/12/22 2258    Lipase [[id number]] Collected: 08/12/22 2248    Specimen: Blood, Venous Updated: 08/12/22 2258    Hepatic function panel [[id number]] Collected: 08/12/22 2248    Specimen: Blood, Venous Updated: 08/12/22 2258    HS Troponin I (with 2 hour reflex) [[id number]] Collected: 08/12/22 2248    Specimen: Blood, Venous Updated: 08/12/22 2258          Imaging Results          X-RAY CHEST 2 VIEWS (In process)                 ECG 12 lead             Scoring tools                                  ED Course & MDM   MDM / ED COURSE / CLINICAL IMPRESSION / DISPO     MDM    ED Course as of 08/13/22 0105   Sat Aug 13, 2022   0011 Will repeat sodium [EB]   0011 Sodium(!!): 123 [EB]   0104 Pt updated [EB]      ED Course User Index  [EB] Deysi Silveira PA C     Clinical Impression      None     Disposition           Deysi Silveira PA C  08/13/22 0226

## 2022-08-13 NOTE — ASSESSMENT & PLAN NOTE
Unclear etiology  With hypochloremia  Possibly excessive fluid intake versus episodes of vomiting  Urine sodium and urine osmolality requested  Sodium improved from 123-127 with 1 L of IV fluids in the ER  So IV fluids continue to monitor
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
## 2022-08-13 NOTE — ASSESSMENT & PLAN NOTE
Seems to be dealing with severe anxiety and stress at this time and may have had a panic attack   Counseling provided  Psychiatric input requested
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
## 2022-08-13 NOTE — ASSESSMENT & PLAN NOTE
Current blood pressures are on the higher side  Is been anxious and stressful as well  If persistently elevated, consider propranolol to help both her anxiety and blood pressure

## 2022-08-13 NOTE — PROGRESS NOTES
The patient is seen and examined by me.  She denies any chest pain, shortness of breath, palpitation dizziness or lightheadedness.  Sodium improved from  124-127.  We will continue with IV fluids and monitor patient for now.

## 2022-08-14 VITALS
DIASTOLIC BLOOD PRESSURE: 93 MMHG | SYSTOLIC BLOOD PRESSURE: 161 MMHG | HEART RATE: 60 BPM | OXYGEN SATURATION: 96 % | RESPIRATION RATE: 16 BRPM | TEMPERATURE: 98.4 F

## 2022-08-14 LAB
ANION GAP SERPL CALC-SCNC: 9 MEQ/L (ref 3–15)
BUN SERPL-MCNC: 8 MG/DL (ref 8–20)
CALCIUM SERPL-MCNC: 9.2 MG/DL (ref 8.9–10.3)
CHLORIDE SERPL-SCNC: 103 MEQ/L (ref 98–109)
CO2 SERPL-SCNC: 24 MEQ/L (ref 22–32)
CREAT SERPL-MCNC: 0.5 MG/DL (ref 0.6–1.1)
GFR SERPL CREATININE-BSD FRML MDRD: >60 ML/MIN/1.73M*2
GLUCOSE SERPL-MCNC: 106 MG/DL (ref 70–99)
POTASSIUM SERPL-SCNC: 4.1 MEQ/L (ref 3.6–5.1)
SODIUM SERPL-SCNC: 136 MEQ/L (ref 136–144)

## 2022-08-14 PROCEDURE — 99238 HOSP IP/OBS DSCHRG MGMT 30/<: CPT | Performed by: HOSPITALIST

## 2022-08-14 PROCEDURE — 63700000 HC SELF-ADMINISTRABLE DRUG: Performed by: HOSPITALIST

## 2022-08-14 PROCEDURE — 36415 COLL VENOUS BLD VENIPUNCTURE: CPT | Performed by: HOSPITALIST

## 2022-08-14 PROCEDURE — 80048 BASIC METABOLIC PNL TOTAL CA: CPT | Performed by: HOSPITALIST

## 2022-08-14 RX ORDER — PROPRANOLOL HYDROCHLORIDE 10 MG/1
10 TABLET ORAL 2 TIMES DAILY
Status: DISCONTINUED | OUTPATIENT
Start: 2022-08-14 | End: 2022-08-14 | Stop reason: HOSPADM

## 2022-08-14 RX ORDER — LEVOTHYROXINE SODIUM 175 UG/1
1 TABLET ORAL WEEKLY
COMMUNITY

## 2022-08-14 RX ORDER — PROPRANOLOL HYDROCHLORIDE 10 MG/1
10 TABLET ORAL 2 TIMES DAILY
Qty: 60 TABLET | Refills: 0 | Status: SHIPPED | OUTPATIENT
Start: 2022-08-14 | End: 2022-09-13

## 2022-08-14 RX ADMIN — PROPRANOLOL HYDROCHLORIDE 10 MG: 10 TABLET ORAL at 09:13

## 2022-08-14 RX ADMIN — LEVOTHYROXINE SODIUM 125 MCG: 0.12 TABLET ORAL at 06:13

## 2022-08-14 NOTE — PLAN OF CARE
Plan of Care Review  Plan of Care Reviewed With: patient  Progress: improving  Outcome Summary: Pt AAOx4, sbp elevated (physicians aware) other VSS, no c/o pain at this time. IVFs infusing, call bell within reach, independent in room.

## 2022-08-14 NOTE — NURSING NOTE
Pt was discharge home in  company.Discharge instructions were given and explained . Both understood.

## 2022-08-14 NOTE — ASSESSMENT & PLAN NOTE
Her blood pressures have been consistently high so was started on propranolol 10 twice daily.    Discussed with patient to follow-up with primary care physician and further adjustment of the meds.    She was advised to check her blood pressure at home with a BP machine current blood pressures are on the higher side  I

## 2022-08-14 NOTE — DISCHARGE SUMMARY
Hospital Medicine Service -  Inpatient Discharge Summary        BRIEF OVERVIEW   Admitting Provider: Puneet Palacios MD  Attending Provider: Magalis Heller MD Attending phys phone: [phone]    PCP: Norberto Vaughn [phone]    Admission Date: 8/12/2022  Discharge Date: 8/14/2022     DISCHARGE DIAGNOSES      Primary Discharge Diagnosis  Hyponatremia    Secondary Discharge Diagnoses  Active Hospital Problems    Diagnosis Date Noted    Hyponatremia 08/13/2022    Disease of thyroid gland     Lipid disorder     Anxiety     Elevated blood-pressure reading without diagnosis of hypertension       Resolved Hospital Problems   No resolved problems to display.       Problem List on Day of Discharge  * Hyponatremia  Assessment & Plan  Sodium levels improved with IV fluids.  Repeat sodium level today 136  DC patient home    Elevated blood-pressure reading without diagnosis of hypertension  Assessment & Plan  Her blood pressures have been consistently high so was started on propranolol 10 twice daily.    Discussed with patient to follow-up with primary care physician and further adjustment of the meds.    She was advised to check her blood pressure at home with a BP machine current blood pressures are on the higher side  I    Anxiety  Assessment & Plan    Evaluated by telepsych  Outpatient follow-up with PCP and psychiatry      Lipid disorder  Assessment & Plan  Continue statin    Hypothyroid  Assessment & Plan  Continue levothyroxine    SUMMARY OF HOSPITALIZATION      Presenting Problem/History of Present Illness  This is a 56 y.o. year-old female admitted on 8/12/2022 with Hyponatremia [E87.1].        Hospital Course  Patient is a 56 y.o. female who presented with acute anxiety, palpitations stress and elevated blood pressure and found to be hyponatremic.  She responded to IV fluids sodium improved to 136.  Her blood pressures have been elevated and was started on propranolol.  Evaluated by telemetry  psych stable for discharge today.    Exam on Day of Discharge  Physical Exam  Vitals reviewed.   HENT:      Head: Normocephalic and atraumatic.   Eyes:      Conjunctiva/sclera: Conjunctivae normal.   Cardiovascular:      Rate and Rhythm: Normal rate and regular rhythm.   Pulmonary:      Effort: Pulmonary effort is normal.      Breath sounds: Normal breath sounds.   Abdominal:      General: Bowel sounds are normal.      Palpations: Abdomen is soft.      Tenderness: There is no abdominal tenderness.   Musculoskeletal:      Cervical back: Neck supple.      Right lower leg: No edema.      Left lower leg: No edema.   Skin:     General: Skin is warm and dry.   Neurological:      General: No focal deficit present.      Mental Status: She is alert and oriented to person, place, and time.   Psychiatric:         Mood and Affect: Mood normal.         Consults During Admission  IP CONSULT TO PSYCHIATRY  IP CONSULT TO TELEPSYCHIATRY    DISCHARGE MEDICATIONS               Medication List      START taking these medications    propranoloL 10 mg tablet  Commonly known as: INDERAL  Take 1 tablet (10 mg total) by mouth 2 (two) times a day.  Dose: 10 mg        CONTINUE taking these medications    calcium citrate-vitamin D3 200 mg-6.25 mcg (250 unit) tablet  Commonly known as: CITRACAL  Take by mouth daily.     evening primrose oil 500 mg capsule  Take 500 mg by mouth.  Dose: 500 mg     FISH OIL-omega-3 fatty acids 340-1,000 mg capsule  Commonly known as: FISH OIL  Take by mouth 2 (two) times a day.     flaxseed oiL 1,000 mg capsule  Take by mouth daily.     ONE-A-DAY WOMEN'S 50 PLUS 400-20 mcg tablet  Take by mouth.  Generic drug: mv,Ca,min-folic acid-vit K1     PROBIOTIC 20 billion cell capsule  Take by mouth.  Generic drug: lactobacillus comb no.10     rosuvastatin 10 mg tablet  Commonly known as: CRESTOR  Take 10 mg by mouth daily.  Dose: 10 mg     SYNTHROID 175 mcg tablet  Take 1 tablet by mouth once a week on Sunday. 150m mcg  every other day  Dose: 1 tablet  Generic drug: levothyroxine     vitamin C 100 mg tablet  100 mg.  Dose: 100 mg  Generic drug: ascorbic acid (vitamin C)             Instructions for after discharge     Follow-up with primary physician (PCP)      In a week for your Blood pressure             PROCEDURES / LABS / IMAGING      Operative Procedures      Other Procedures      Pertinent Labs  Results from last 7 days   Lab Units 08/14/22  0910 08/13/22  1843 08/13/22  0018   SODIUM mEQ/L 136 134* 127*   POTASSIUM mEQ/L 4.1 4.0 3.4*   CHLORIDE mEQ/L 103 102 92*   CO2 mEQ/L 24 25 24   BUN mg/dL 8 15 9   CREATININE mg/dL 0.5* 0.6 0.4*   GLUCOSE mg/dL 106* 123* 105*   CALCIUM mg/dL 9.2 9.0 8.4*       SARS-CoV-2 (COVID-19) (no units)   Date/Time Value   08/13/2022 0123 Negative       Pertinent Imaging  X-RAY CHEST 2 VIEWS    Result Date: 8/13/2022  IMPRESSION: No active disease is seen in the chest.      OUTPATIENT  FOLLOW-UP / REFERRALS / PENDING TESTS        Outpatient Follow-Up Appointments  Encounter Information    This patient does not currently have any appointments scheduled.         Referrals  No orders of the defined types were placed in this encounter.      Test Results Pending at Discharge  Unresulted Labs (From admission, onward)            None          Important Issues to Address in Follow-Up  Follow-up with primary care physician for elevated blood pressure    DISCHARGE DISPOSITION AND DESTINATION      Disposition:    Destination:                              Code Status At Discharge: Full Code    Physician Order for Life-Sustaining Treatment Document Status      No documents found

## 2022-09-09 ENCOUNTER — TELEPHONE (OUTPATIENT)
Dept: SURGERY | Facility: CLINIC | Age: 57
End: 2022-09-09
Payer: COMMERCIAL

## 2022-09-09 PROCEDURE — 200200 PR NO CHARGE: Performed by: SURGERY

## 2022-09-09 NOTE — H&P
Chief Complaint: No chief complaint on file.      HPI     Patient is a 56 y.o. female who presents with the following:    - last scope 2018  - hx colon polyps       Medical History:   Past Medical History:   Diagnosis Date    Arthritis     Disease of thyroid gland     H/O thyroid nodule     Lipid disorder        Surgical History:   Past Surgical History:   Procedure Laterality Date    ABDOMINAL SURGERY      ABDOMINAL SURGERY      THYROIDECTOMY      THYROIDECTOMY      TUBAL LIGATION         Social History:   Social History     Socioeconomic History    Marital status:      Spouse name: Not on file    Number of children: Not on file    Years of education: Not on file    Highest education level: Not on file   Occupational History    Not on file   Tobacco Use    Smoking status: Current Every Day Smoker     Packs/day: 0.50     Years: 30.00     Pack years: 15.00    Smokeless tobacco: Never Used   Substance and Sexual Activity    Alcohol use: Yes     Alcohol/week: 1.0 standard drink     Types: 1 Glasses of wine per week    Drug use: No    Sexual activity: Defer   Other Topics Concern    Not on file   Social History Narrative    Not on file     Social Determinants of Health     Financial Resource Strain: Not on file   Food Insecurity: Not on file   Transportation Needs: Not on file   Physical Activity: Not on file   Stress: Not on file   Social Connections: Not on file   Intimate Partner Violence: Not on file   Housing Stability: Not on file       Family History:   No family history on file.    Allergies:   Patient has no known allergies.    Current Medications:      Current Outpatient Medications:     ascorbic acid, vitamin C, (vitamin C) 100 mg tablet, 100 mg., Disp: , Rfl:     calcium citrate-vitamin D3 (CITRACAL) 200 mg calcium -250 unit tablet, Take by mouth daily., Disp: , Rfl:     evening primrose oil 500 mg capsule, Take 500 mg by mouth., Disp: , Rfl:     flaxseed oil 1,000 mg  capsule, Take by mouth daily., Disp: , Rfl:     lactobacillus comb no.10 (PROBIOTIC) 20 billion cell capsule, Take by mouth., Disp: , Rfl:     levothyroxine (SYNTHROID) 125 mcg tablet, Take 175 mcg by mouth daily., Disp: , Rfl:     mv,Ca,min-folic acid-vit K1 (ONE-A-DAY WOMEN'S 50 PLUS) 400-20 mcg tablet, Take by mouth., Disp: , Rfl:     omega-3 fatty acids-fish oil 340-1,000 mg capsule, Take by mouth 2 (two) times a day., Disp: , Rfl:     Review of Systems  All 14 systems reviewed and the findings are not pertinent to the current problem.    Objective     Vital Signs  There were no vitals filed for this visit.  There is no height or weight on file to calculate BMI.      Physicial Exam  no    Problem List Items Addressed This Visit     None        Plan - scope high risk      Jv Barroso MD 3/19/2022 9:10 AM

## 2022-09-09 NOTE — TELEPHONE ENCOUNTER
Jen is calling to get a covid test before her colonoscopy on Monday. Gave her the number to schedule. Just checking to see if she needs a script.

## 2022-09-12 ENCOUNTER — APPOINTMENT (OUTPATIENT)
Dept: LAB | Facility: HOSPITAL | Age: 57
End: 2022-09-12
Attending: SURGERY
Payer: COMMERCIAL

## 2022-09-12 ENCOUNTER — TELEPHONE (OUTPATIENT)
Dept: SURGERY | Facility: CLINIC | Age: 57
End: 2022-09-12
Payer: COMMERCIAL

## 2022-09-12 DIAGNOSIS — Z11.52 ENCOUNTER FOR PREPROCEDURE SCREENING LABORATORY TESTING FOR COVID-19: ICD-10-CM

## 2022-09-12 DIAGNOSIS — Z01.812 ENCOUNTER FOR PREPROCEDURE SCREENING LABORATORY TESTING FOR COVID-19: ICD-10-CM

## 2022-09-12 PROCEDURE — U0003 INFECTIOUS AGENT DETECTION BY NUCLEIC ACID (DNA OR RNA); SEVERE ACUTE RESPIRATORY SYNDROME CORONAVIRUS 2 (SARS-COV-2) (CORONAVIRUS DISEASE [COVID-19]), AMPLIFIED PROBE TECHNIQUE, MAKING USE OF HIGH THROUGHPUT TECHNOLOGIES AS DESCRIBED BY CMS-2020-01-R: HCPCS

## 2022-09-12 PROCEDURE — C9803 HOPD COVID-19 SPEC COLLECT: HCPCS

## 2022-09-12 NOTE — TELEPHONE ENCOUNTER
Destiny has a colonoscopy on Thursday and she has a question about taking her meds on the morning of the colonoscopy.

## 2022-09-13 LAB — SARS-COV-2 RNA RESP QL NAA+PROBE: NEGATIVE

## 2022-09-15 ENCOUNTER — HOSPITAL ENCOUNTER (OUTPATIENT)
Facility: HOSPITAL | Age: 57
Discharge: HOME | End: 2022-09-15
Attending: SURGERY | Admitting: SURGERY
Payer: COMMERCIAL

## 2022-09-15 ENCOUNTER — ANESTHESIA EVENT (OUTPATIENT)
Dept: ENDOSCOPY | Facility: HOSPITAL | Age: 57
End: 2022-09-15
Payer: COMMERCIAL

## 2022-09-15 ENCOUNTER — ANESTHESIA (OUTPATIENT)
Dept: ENDOSCOPY | Facility: HOSPITAL | Age: 57
End: 2022-09-15
Payer: COMMERCIAL

## 2022-09-15 VITALS
OXYGEN SATURATION: 99 % | WEIGHT: 173 LBS | SYSTOLIC BLOOD PRESSURE: 167 MMHG | TEMPERATURE: 97.2 F | HEART RATE: 60 BPM | HEIGHT: 69 IN | RESPIRATION RATE: 16 BRPM | DIASTOLIC BLOOD PRESSURE: 93 MMHG | BODY MASS INDEX: 25.62 KG/M2

## 2022-09-15 DIAGNOSIS — Z86.0100 PERSONAL HISTORY OF COLONIC POLYPS: ICD-10-CM

## 2022-09-15 PROCEDURE — 25800000 HC PHARMACY IV SOLUTIONS: Performed by: SPECIALIST

## 2022-09-15 PROCEDURE — 71000011 HC PACU PHASE 1 EA ADDL MIN: Performed by: SURGERY

## 2022-09-15 PROCEDURE — 0DBP8ZZ EXCISION OF RECTUM, VIA NATURAL OR ARTIFICIAL OPENING ENDOSCOPIC: ICD-10-PCS | Performed by: SURGERY

## 2022-09-15 PROCEDURE — 88305 TISSUE EXAM BY PATHOLOGIST: CPT | Performed by: SURGERY

## 2022-09-15 PROCEDURE — 37000001 HC ANESTHESIA GENERAL: Performed by: SURGERY

## 2022-09-15 PROCEDURE — 71000001 HC PACU PHASE 1 INITIAL 30MIN: Performed by: SURGERY

## 2022-09-15 PROCEDURE — 45380 COLONOSCOPY AND BIOPSY: CPT | Mod: PT | Performed by: SURGERY

## 2022-09-15 PROCEDURE — 63600000 HC DRUGS/DETAIL CODE: Performed by: SPECIALIST

## 2022-09-15 PROCEDURE — 75000011 HC COLONSCOPY BIOPSY: Performed by: SURGERY

## 2022-09-15 RX ORDER — SODIUM CHLORIDE 9 MG/ML
INJECTION, SOLUTION INTRAVENOUS CONTINUOUS PRN
Status: DISCONTINUED | OUTPATIENT
Start: 2022-09-15 | End: 2022-09-15 | Stop reason: SURG

## 2022-09-15 RX ORDER — PROPOFOL 10 MG/ML
INJECTION, EMULSION INTRAVENOUS AS NEEDED
Status: DISCONTINUED | OUTPATIENT
Start: 2022-09-15 | End: 2022-09-15 | Stop reason: SURG

## 2022-09-15 RX ADMIN — PROPOFOL 50 MG: 10 INJECTION, EMULSION INTRAVENOUS at 08:42

## 2022-09-15 RX ADMIN — PROPOFOL 200 MG: 10 INJECTION, EMULSION INTRAVENOUS at 08:50

## 2022-09-15 RX ADMIN — SODIUM CHLORIDE: 0.9 INJECTION, SOLUTION INTRAVENOUS at 08:25

## 2022-09-15 RX ADMIN — PROPOFOL 150 MG: 10 INJECTION, EMULSION INTRAVENOUS at 08:34

## 2022-09-15 RX ADMIN — PROPOFOL 50 MG: 10 INJECTION, EMULSION INTRAVENOUS at 08:38

## 2022-09-15 ASSESSMENT — PAIN SCALES - GENERAL: PAIN_LEVEL: 0

## 2022-09-15 ASSESSMENT — LIFESTYLE VARIABLES: TOBACCO_USE: 1

## 2022-09-15 NOTE — ANESTHESIOLOGIST PRE-PROCEDURE ATTESTATION
Pre-Procedure Patient Identification:  I am the Primary Anesthesiologist and have identified the patient on 09/15/22 at 8:31 AM.   I have confirmed the procedure(s) will be performed by the following surgeon/proceduralist Jv Barroso MD.

## 2022-09-15 NOTE — ANESTHESIA PREPROCEDURE EVALUATION
Relevant Problems   GASTROINTESTINAL   (+) Rectal bleeding      NEUROLOGY   (+) Anxiety      URINARY SYSTEM   (+) Hyponatremia      Other   (+) Hypothyroid       Anesthesia ROS/MED HX    Anesthesia History    Previous anesthetics  Pulmonary    history of tobacco use  Neuro/Psych    Anxiety  Cardiovascular- neg   Covid19 Test Reviewed  Hematological - neg  GI/Hepatic- neg  Musculoskeletal- neg  Renal Disease- neg  Endo/Other   Hypothyroidism  Body Habitus: Overweight       Past Surgical History:   Procedure Laterality Date    ABDOMINAL SURGERY      ABDOMINAL SURGERY      THYROIDECTOMY      THYROIDECTOMY      TUBAL LIGATION         Physical Exam    Airway   Mallampati: II   TM distance: >3 FB   Neck ROM: full  Cardiovascular - normal   Rhythm: regular   Rate: normalPulmonary - normal   clear to auscultation  Dental - normal        Anesthesia Plan    Plan: general    Technique: total IV anesthesia     Airway: natural airway / supplemental oxygen       patient did not smoke on day of surgery  ASA 2  Blood Products:   Use of Blood Products Discussed: No   Anesthetic plan and risks discussed with: patient  Induction:    intravenous   Postop Plan:   Patient Disposition: phase II then home   Pain Management: IV analgesics

## 2022-09-15 NOTE — OR SURGEON
Pre-Procedure patient identification:  I am the primary operating surgeon/proceduralist and I have identified the patient on 09/15/22 at 8:19 AM Jv Barroso MD  Phone Number: [phone]

## 2022-09-15 NOTE — ANESTHESIA POSTPROCEDURE EVALUATION
Patient: Jen Rocha    Procedure Summary     Date: 09/15/22 Room / Location: [facility] GI 2 / [facility]    Anesthesia Start: 0831 Anesthesia Stop: 0857    Procedure: FLEXIBLE COLONOSCOPY PROXIMAL TO SPLENIC FLEXURE WITH BIOPSY (N/A Anus) Diagnosis:       Personal history of colonic polyps      (hx colon polyps)    Providers: Jv Barroso MD Responsible Provider: Silvia Chen MD    Anesthesia Type: general ASA Status: 2          Anesthesia Type: general  PACU Vitals    No data found in the last 10 encounters.           Anesthesia Post Evaluation    Pain score: 0  Pain management: adequate  Mode of pain management: IV medication  Patient location during evaluation: PACU  Patient participation: complete - patient participated  Level of consciousness: awake and alert  Cardiovascular status: acceptable  Airway Patency: adequate  Respiratory status: acceptable  Hydration status: acceptable  Anesthetic complications: no

## 2022-09-15 NOTE — OP NOTE
_______________________________________________________________________________  Patient Name: Jen Rocha           Procedure Date: 9/15/2022 6:14 AM  MRN: [id number]                     Account Number: [id number]  YOB: 1965              Age: 56  Gender: Female                        Note Status: Finalized  Attending MD: JAMES LUNA MD~JEREMY  _______________________________________________________________________________  Procedure:             Colonoscopy  Indications:           High risk colon cancer surveillance: Personal history  of colonic polyps  Providers:             JAMES LUNA MD~JEREMY (Doctor), Clary Kennedy RN (Nurse), Rebekah Carvalho RN (Nurse)  Referring MD:          KYA GREENWOOD MD  Requesting Provider:  Medicines:             Monitored Anesthesia Care  Complications:         No immediate complications.  _______________________________________________________________________________  Procedure:             After I obtained informed consent, the scope was  passed under direct vision. Throughout the procedure,  the patient's blood pressure, pulse, and oxygen  saturations were monitored continuously. The  colonoscope was introduced through the anus and  advanced to the cecum, identified by appendiceal  orifice and ileocecal valve. The colonoscopy was  performed without difficulty. The patient tolerated  the procedure well. The quality of the bowel  preparation was good.  Estimated Blood Loss:  Estimated blood loss: none.  Findings:  The perianal and digital rectal examinations were normal.  A 4 mm polyp was found in the rectum. The polyp was sessile. The polyp  was removed with a cold biopsy forceps. Resection and retrieval were  complete.  Impression:            - One 4 mm polyp in the rectum, removed with a cold  biopsy forceps. Resected and retrieved.  Recommendation:        - Repeat colonoscopy in 5 years for surveillance.  Procedure Code(s):     ---  Professional ---  07188, Colonoscopy, flexible; with biopsy, single or  multiple  Diagnosis Code(s):     --- Professional ---  Z86.010, Personal history of colonic polyps  K62.1, Rectal polyp  CPT copyright 2020 [employer]. All rights reserved.  The codes documented in this report are preliminary and upon  review may  be revised to meet current compliance requirements.  Attending Participation:  I personally performed the entire procedure.  ____________________________  JAMES LUNA MD~JEREMY  9/15/2022 9:06:13 AM  This report has been signed electronically.  Number of Addenda: 0  Note Initiated On: 9/15/2022 6:14 AM

## 2022-09-15 NOTE — DISCHARGE INSTR - OTHER ORDERS
It is important that you quit smoking. Please call [phone] to join [employer] [facility] [employer]'s free tobacco cessation program. New programs begin soon.

## 2022-09-16 LAB
CASE RPRT: NORMAL
CLINICAL INFO: NORMAL
PATH REPORT.FINAL DX SPEC: NORMAL
PATH REPORT.GROSS SPEC: NORMAL

## 2023-11-27 NOTE — H&P
Chief Complaint: No chief complaint on file.      HPI     Patient is a 56 y.o. female who presents with the following:    - last scope 2018  - hx colon polyps       Medical History:   Past Medical History:   Diagnosis Date   • Arthritis    • Disease of thyroid gland    • H/O thyroid nodule    • Lipid disorder        Surgical History:   Past Surgical History:   Procedure Laterality Date   • ABDOMINAL SURGERY     • ABDOMINAL SURGERY     • THYROIDECTOMY     • THYROIDECTOMY     • TUBAL LIGATION         Social History:   Social History     Socioeconomic History   • Marital status:      Spouse name: Not on file   • Number of children: Not on file   • Years of education: Not on file   • Highest education level: Not on file   Occupational History   • Not on file   Tobacco Use   • Smoking status: Current Every Day Smoker     Packs/day: 0.50     Years: 30.00     Pack years: 15.00   • Smokeless tobacco: Never Used   Substance and Sexual Activity   • Alcohol use: Yes     Alcohol/week: 1.0 standard drink     Types: 1 Glasses of wine per week   • Drug use: No   • Sexual activity: Defer   Other Topics Concern   • Not on file   Social History Narrative   • Not on file     Social Determinants of Health     Financial Resource Strain: Not on file   Food Insecurity: Not on file   Transportation Needs: Not on file   Physical Activity: Not on file   Stress: Not on file   Social Connections: Not on file   Intimate Partner Violence: Not on file   Housing Stability: Not on file       Family History:   No family history on file.    Allergies:   Patient has no known allergies.    Current Medications:      Current Outpatient Medications:   •  ascorbic acid, vitamin C, (vitamin C) 100 mg tablet, 100 mg., Disp: , Rfl:   •  calcium citrate-vitamin D3 (CITRACAL) 200 mg calcium -250 unit tablet, Take by mouth daily., Disp: , Rfl:   •  evening primrose oil 500 mg capsule, Take 500 mg by mouth., Disp: , Rfl:   •  flaxseed oil 1,000 mg  capsule, Take by mouth daily., Disp: , Rfl:   •  lactobacillus comb no.10 (PROBIOTIC) 20 billion cell capsule, Take by mouth., Disp: , Rfl:   •  levothyroxine (SYNTHROID) 125 mcg tablet, Take 175 mcg by mouth daily., Disp: , Rfl:   •  mv,Ca,min-folic acid-vit K1 (ONE-A-DAY WOMEN'S 50 PLUS) 400-20 mcg tablet, Take by mouth., Disp: , Rfl:   •  omega-3 fatty acids-fish oil 340-1,000 mg capsule, Take by mouth 2 (two) times a day., Disp: , Rfl:     Review of Systems  All 14 systems reviewed and the findings are not pertinent to the current problem.    Objective     Vital Signs  There were no vitals filed for this visit.  There is no height or weight on file to calculate BMI.      Physicial Exam  no    Problem List Items Addressed This Visit     None        Plan - scope high risk      Jv Barroso MD 3/19/2022 9:10 AM          provider

## 2024-01-30 NOTE — ED ATTESTATION NOTE
I have personally seen and examined the patient.  I reviewed and agree with physician assistant / nurse practitioners assessment and plan of care, with the following exceptions: None  My examination, assessment, and plan of care of Jen Rocha is as follows:  56-year-old female presents to the emergency department with her spouse complaining of worsening anxiety for the past few days.  Notes increased family stress.  Today had some worsening nausea vomiting, palpitations, chest discomfort.  No relief with laying down to try Relaxyl came into the ED.  Noted high blood pressure at home as well, no history of hypertension.  Dates she feels better in the ED.  No early CAD in first-degree relatives.    Vital signs noted, hypertensive  Somewhat anxious 56-year-old female no acute distress  Lungs scattered rhonchi, heart regular  Abdomen soft nontender    EKG shows sinus bradycardia 58 bpm, largely reassuring EKG with reassuring intervals    Plan telemetry, pulse oximetry, chest x-ray, laboratory studies, reevaluation.     I was physically present for the key/critical portions of the following procedures: None      Of note patient found to be hyponatremic, confirmed on repeat labs.  Could be secondary to volume losses from vomiting.  Will require admission for slow correction.     Mike Longoria,   08/13/22 [id number]     Individual Follow-Up Form    1/30/2024    Quit Date: TBD    Clinical Status of Patient: Outpatient    Length of Service: 45 minutes    Continuing Medication: yes  Chantix     Target Symptoms: Withdrawal and medication side effects. The following were  rated moderate (3) to severe (4) on TCRS:  Moderate (3): Nausea  Severe (4): None Reported    Comments: Spoke with patient at length in clinic regarding tobacco cessation progress. Patient remains on prescribed tobacco cessation medication 1mg Chantix without any negative side effects at this time.  Patient decreased to 10 cigarettes daily.  Patient will cut down to 8 cigarettes a day this week.  Reviewed strategies, controlling environment, cues, triggers, new goals set. Introduced high risk situations with preparation interventions, caffeine similarities with withdrawal issues of habit and nicotine, Understanding urges, cravings, stress and relaxation. Open discussion with intervention discussion.  Counselor advised patient to eat and drink plenty of water when taking Chantix to help avoid feeling nauseated.  Patient also stated that her throat burns a little more when she trys to smoke cigarettes.  Patient also stated that her short straws are helping address the hand to mouth habit associated with smoking.  Counselor also discussed the risk of relighting cigarettes.  Patient appeared to be receptive to the information given.  Counselor will continue to motivate patient to be tobacco free.    Diagnosis: F17.200    Next Visit: 1 week

## (undated) DEVICE — CLEANSTART BEDSIDE KIT 500ML

## (undated) DEVICE — GOWN SURG X-LARGE MICROCOOL

## (undated) DEVICE — GOWN SURG LARGE MICROCOOL

## (undated) DEVICE — BIOGUARD VALVES

## (undated) DEVICE — FORCEP COLD RADIAL JAW